# Patient Record
Sex: FEMALE | Race: WHITE
[De-identification: names, ages, dates, MRNs, and addresses within clinical notes are randomized per-mention and may not be internally consistent; named-entity substitution may affect disease eponyms.]

---

## 2020-02-21 ENCOUNTER — HOSPITAL ENCOUNTER (EMERGENCY)
Dept: HOSPITAL 41 - JD.ED | Age: 40
Discharge: HOME | End: 2020-02-21
Payer: COMMERCIAL
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
## 2020-02-21 DIAGNOSIS — F10.10: Primary | ICD-10-CM

## 2020-02-21 PROCEDURE — 80306 DRUG TEST PRSMV INSTRMNT: CPT

## 2020-02-21 PROCEDURE — 80053 COMPREHEN METABOLIC PANEL: CPT

## 2020-02-21 PROCEDURE — 96376 TX/PRO/DX INJ SAME DRUG ADON: CPT

## 2020-02-21 PROCEDURE — 96366 THER/PROPH/DIAG IV INF ADDON: CPT

## 2020-02-21 PROCEDURE — 96375 TX/PRO/DX INJ NEW DRUG ADDON: CPT

## 2020-02-21 PROCEDURE — 99284 EMERGENCY DEPT VISIT MOD MDM: CPT

## 2020-02-21 PROCEDURE — 36415 COLL VENOUS BLD VENIPUNCTURE: CPT

## 2020-02-21 PROCEDURE — 81025 URINE PREGNANCY TEST: CPT

## 2020-02-21 PROCEDURE — 96361 HYDRATE IV INFUSION ADD-ON: CPT

## 2020-02-21 PROCEDURE — 81003 URINALYSIS AUTO W/O SCOPE: CPT

## 2020-02-21 PROCEDURE — 83735 ASSAY OF MAGNESIUM: CPT

## 2020-02-21 PROCEDURE — 80307 DRUG TEST PRSMV CHEM ANLYZR: CPT

## 2020-02-21 PROCEDURE — 85025 COMPLETE CBC W/AUTO DIFF WBC: CPT

## 2020-02-21 PROCEDURE — 96365 THER/PROPH/DIAG IV INF INIT: CPT

## 2020-02-21 RX ADMIN — SODIUM CHLORIDE ONE MG: 9 INJECTION, SOLUTION INTRAVENOUS at 15:52

## 2020-02-21 RX ADMIN — SODIUM CHLORIDE ONE: 9 INJECTION, SOLUTION INTRAVENOUS at 15:54

## 2020-02-21 NOTE — EDM.PDOC
ED HPI GENERAL MEDICAL PROBLEM





- General


Chief Complaint: Drug or Alcohol Abuse


Stated Complaint: DETOX


Time Seen by Provider: 20 15:10





- History of Present Illness


INITIAL COMMENTS - FREE TEXT/NARRATIVE: 








40-year-old female presents to the emergency room for alcohol detox and 

possible placement into the [facility]





Patient has a long history of alcoholism.  She has tried to stop multiple 

times.  The patient was found to be acting intoxicated at her place of 

employment and was confronted with this today.  She is had issues with this in 

the past but the patient did not seek help.  Today they did contact [facility] 

who referred her here for detox and/or medical clearance.





Past medical history significant for heavy alcohol use.  She has no chronic 

medical problems denies being pregnant.  She has had a exploratory laparotomy 

with resultant oophorectomy because of pain.  Patient cannot provide any other 

information in this.  She is a  3 para 3





  ** Generalized


Pain Score (Numeric/FACES): 6





- Related Data


 Allergies











Allergy/AdvReac Type Severity Reaction Status Date / Time


 


No Known Allergies Allergy   Verified 20 15:16











Home Meds: 


 Home Meds





LORazepam [Lorazepam] 1 mg PO Q6H #20 tablet 20 [Rx]


Ondansetron [Zofran ODT] 4 mg PO Q6H #12 tab.dis 20 [Rx]











ED ROS GENERAL





- Review of Systems


Review Of Systems: See Below


Constitutional: Reports: No Symptoms, Weight Gain


Respiratory: Reports: No Symptoms


Cardiovascular: Reports: No Symptoms


Endocrine: Reports: No Symptoms


GI/Abdominal: Reports: Abdominal Pain (Upper abdominal discomfort), Nausea


Musculoskeletal: Reports: No Symptoms


Skin: Reports: No Symptoms


Neurological: Reports: Other


Psychiatric: Reports: No Symptoms


Hematologic/Lymphatic: Reports: Other


Immunologic: Reports: Other





ED EXAM, GENERAL





- Physical Exam


Exam: See Below


Exam Limited By: Intoxication (She is intoxicated but trying to be cooperative.)


General Appearance: Alert, No Apparent Distress


Ears: Normal External Exam, Normal Canal, Hearing Grossly Normal, Other (He has 

some scarring of both tympanic membranes apparently she had ear tubes as a child

)


Nose: Normal Inspection, Normal Mucosa, No Blood


Throat/Mouth: Normal Inspection, Normal Lips, Other (Mild evidence of dental 

decay)


Head: Atraumatic, Normocephalic


Neck: Normal Inspection, Supple, Non-Tender, Full Range of Motion.  No: 

Lymphadenopathy (L), Lymphadenopathy (R)


Respiratory/Chest: No Respiratory Distress, Lungs Clear, Normal Breath Sounds


Cardiovascular: Regular Rate, Rhythm, No Edema, No Murmur


GI/Abdominal: Normal Bowel Sounds, Soft, Non-Tender


Back Exam: Normal Inspection.  No: CVA Tenderness (L), CVA Tenderness (R)


Extremities: Normal Inspection, No Pedal Edema


Neurological: Other (Is intoxicated but cooperative)





Course





- Vital Signs


Last Recorded V/S: 


 Last Vital Signs











Temp  36.8 C   20 15:08


 


Pulse  80   20 18:30


 


Resp  20   20 15:08


 


BP  117/83   20 16:31


 


Pulse Ox  100   20 18:30














- Orders/Labs/Meds


Orders: 


 Active Orders 24 hr











 Category Date Time Status


 


 Bedrest Bathroom Privileges [[facility]] ASDIRECTED Care  20 19:05 Inactive


 


 CIWAA Assessment [[facility]] Q15M Care  20 19:05 Inactive


 


 CIW[facility] Assessment [RC] Q1H Care  20 19:05 Inactive


 


 CIW[facility] Assessment [RC] Q30M Care  20 19:05 Inactive


 


 CIW[facility] Assessment [RC] Q4H Care  20 19:05 Inactive


 


 CIW[facility] Assessment [RC] Q4H Care  20 19:49 Ordered


 


 Cardiac Monitoring [RC] CONTINUOUS Care  20 19:07 Inactive


 


 Intake and Output [RC] QSHIFT Care  20 19:07 Inactive


 


 Notify Provider Consults [RC] ASDIRECTED Care  20 19:08 Active


 


 Notify Provider [RC] PRN Care  20 19:05 Inactive


 


 Oxygen Therapy [RC] PRN Care  20 19:05 Inactive


 


 Pulse Oximetry [RC] CONTINUOUS Care  20 19:07 Inactive


 


 VTE/DVT Education [[facility]] PER UNIT ROUTINE Care  20 19:05 Inactive


 


 Vital Signs [RC] Q4H Care  20 19:05 Inactive


 


 Consult to Physician [CONS] Routine Cons  20 19:05 Active


 


 Potassium Chloride [KCl 10 MEQ in Water 100 ML] 10 meq Med  20 17:00 

Active





 Premix Bag 1 bag   





 IV ASDIRECTED   


 


 Potassium Chloride [KCl 10 MEQ in Water 100 ML] 10 meq Med  20 19:30 

Active





 Premix Bag 1 bag   





 IV Q1H   








 Medication Orders





Potassium Chloride 10 meq/ (Premix)  100 mls @ 100 mls/hr IV ASDIRECTED NADIA


   Last Admin: 20 17:13  Dose: 100 mls/hr


Potassium Chloride 10 meq/ (Premix)  100 mls @ 100 mls/hr IV Q1H NADIA


   Stop: 20 23:29








Labs: 


 Laboratory Tests











  20 Range/Units





  15:45 15:45 17:07 


 


WBC   6.99   (3.98-10.04)  K/mm3


 


RBC   4.79   (3.98-5.22)  M/mm3


 


Hgb   13.5   (11.2-15.7)  gm/dl


 


Hct   40.9   (34.1-44.9)  %


 


MCV   85.4   (79.4-94.8)  fl


 


MCH   28.2   (25.6-32.2)  pg


 


MCHC   33.0   (32.2-35.5)  g/dl


 


RDW Std Deviation   42.5   (36.4-46.3)  fL


 


Plt Count   183   (182-369)  K/mm3


 


MPV   10.5   (9.4-12.3)  fl


 


Neut % (Auto)   59.1   (34.0-71.1)  %


 


Lymph % (Auto)   35.1   (19.3-51.7)  %


 


Mono % (Auto)   4.0 L   (4.7-12.5)  %


 


Eos % (Auto)   1.1   (0.7-5.8)  


 


Baso % (Auto)   0.6   (0.1-1.2)  %


 


Neut # (Auto)   4.13   (1.56-6.13)  K/mm3


 


Lymph # (Auto)   2.45   (1.18-3.74)  K/mm3


 


Mono # (Auto)   0.28   (0.24-0.36)  K/mm3


 


Eos # (Auto)   0.08   (0.04-0.36)  K/mm3


 


Baso # (Auto)   0.04   (0.01-0.08)  K/mm3


 


Sodium  144    (136-145)  mEq/L


 


Potassium  3.4 L    (3.5-5.1)  mEq/L


 


Chloride  105    ()  mEq/L


 


Carbon Dioxide  22    (21-32)  mEq/L


 


Anion Gap  20.4 H    (5-15)  


 


BUN  10    (7-18)  mg/dL


 


Creatinine  0.8    (0.55-1.02)  mg/dL


 


Est Cr Clr Drug Dosing  85.76    mL/min


 


Estimated GFR (MDRD)  > 60    (>60)  mL/min


 


BUN/Creatinine Ratio  12.5 L    (14-18)  


 


Glucose  113 H    ()  mg/dL


 


Calcium  8.9    (8.5-10.1)  mg/dL


 


Magnesium  2.1    (1.8-2.4)  mg/dl


 


Total Bilirubin  0.6    (0.2-1.0)  mg/dL


 


AST  30    (15-37)  U/L


 


ALT  38    (14-59)  U/L


 


Alkaline Phosphatase  72    ()  U/L


 


Total Protein  8.1    (6.4-8.2)  g/dl


 


Albumin  4.5    (3.4-5.0)  g/dl


 


Globulin  3.6    gm/dL


 


Albumin/Globulin Ratio  1.3    (1-2)  


 


Urine Color     (Yellow)  


 


Urine Appearance     (Clear)  


 


Urine pH     (5.0-8.0)  


 


Ur Specific Gravity     (1.005-1.030)  


 


Urine Protein     (Negative)  


 


Urine Glucose (UA)     (Negative)  


 


Urine Ketones     (Negative)  


 


Urine Occult Blood     (Negative)  


 


Urine Nitrite     (Negative)  


 


Urine Bilirubin     (Negative)  


 


Urine Urobilinogen     (0.2-1.0)  


 


Ur Leukocyte Esterase     (Negative)  


 


Urine HCG, Qual    Negative  (NEGATIVE)  


 


Urine Opiates Screen     (ADCRHZ=233)  


 


Ur Buprenorphine Scrn     (CUTOFF=10)  


 


Ur Oxycodone Screen     (FPZ0AX=150)  


 


Urine Methadone Screen     (SNHBZH=701)  


 


Ur Propoxyphene Screen     (KIIFBO=906)  


 


Ur Barbiturates Screen     (BKIBHF=467)  


 


Ur Tricyclics Screen     (ZTSNEU=834)  


 


Ur Phencyclidine Scrn     (CUTOFF=25)  


 


Ur Amphetamine Screen     (XUIGWL=045)  


 


U Methamphetamines Scrn     (TMGFCQ=234)  


 


U Benzodiazepines Scrn     (BBAKYX=421)  


 


U Cocaine Metab Screen     (RWMQZS=383)  


 


U Marijuana (THC) Screen     (CUTOFF=50)  


 


Ethyl Alcohol  0.28    (0.00)  gm%














  02/21/20 02/21/20 02/21/20 Range/Units





  17:07 17:07 18:39 


 


WBC     (3.98-10.04)  K/mm3


 


RBC     (3.98-5.22)  M/mm3


 


Hgb     (11.2-15.7)  gm/dl


 


Hct     (34.1-44.9)  %


 


MCV     (79.4-94.8)  fl


 


MCH     (25.6-32.2)  pg


 


MCHC     (32.2-35.5)  g/dl


 


RDW Std Deviation     (36.4-46.3)  fL


 


Plt Count     (182-369)  K/mm3


 


MPV     (9.4-12.3)  fl


 


Neut % (Auto)     (34.0-71.1)  %


 


Lymph % (Auto)     (19.3-51.7)  %


 


Mono % (Auto)     (4.7-12.5)  %


 


Eos % (Auto)     (0.7-5.8)  


 


Baso % (Auto)     (0.1-1.2)  %


 


Neut # (Auto)     (1.56-6.13)  K/mm3


 


Lymph # (Auto)     (1.18-3.74)  K/mm3


 


Mono # (Auto)     (0.24-0.36)  K/mm3


 


Eos # (Auto)     (0.04-0.36)  K/mm3


 


Baso # (Auto)     (0.01-0.08)  K/mm3


 


Sodium     (136-145)  mEq/L


 


Potassium     (3.5-5.1)  mEq/L


 


Chloride     ()  mEq/L


 


Carbon Dioxide     (21-32)  mEq/L


 


Anion Gap     (5-15)  


 


BUN     (7-18)  mg/dL


 


Creatinine     (0.55-1.02)  mg/dL


 


Est Cr Clr Drug Dosing     mL/min


 


Estimated GFR (MDRD)     (>60)  mL/min


 


BUN/Creatinine Ratio     (14-18)  


 


Glucose     ()  mg/dL


 


Calcium     (8.5-10.1)  mg/dL


 


Magnesium     (1.8-2.4)  mg/dl


 


Total Bilirubin     (0.2-1.0)  mg/dL


 


AST     (15-37)  U/L


 


ALT     (14-59)  U/L


 


Alkaline Phosphatase     ()  U/L


 


Total Protein     (6.4-8.2)  g/dl


 


Albumin     (3.4-5.0)  g/dl


 


Globulin     gm/dL


 


Albumin/Globulin Ratio     (1-2)  


 


Urine Color  Yellow    (Yellow)  


 


Urine Appearance  Clear    (Clear)  


 


Urine pH  6.5    (5.0-8.0)  


 


Ur Specific Gravity  1.020    (1.005-1.030)  


 


Urine Protein  Negative    (Negative)  


 


Urine Glucose (UA)  Negative    (Negative)  


 


Urine Ketones  Negative    (Negative)  


 


Urine Occult Blood  Negative    (Negative)  


 


Urine Nitrite  Negative    (Negative)  


 


Urine Bilirubin  Negative    (Negative)  


 


Urine Urobilinogen  0.2    (0.2-1.0)  


 


Ur Leukocyte Esterase  Negative    (Negative)  


 


Urine HCG, Qual     (NEGATIVE)  


 


Urine Opiates Screen   Negative   (LVRZOV=469)  


 


Ur Buprenorphine Scrn   Negative   (CUTOFF=10)  


 


Ur Oxycodone Screen   Negative   (KTW5RM=240)  


 


Urine Methadone Screen   Negative   (VZSIQA=388)  


 


Ur Propoxyphene Screen   Negative   (PXUUAJ=957)  


 


Ur Barbiturates Screen   Negative   (APYGNM=028)  


 


Ur Tricyclics Screen   Negative   (SPUWWX=586)  


 


Ur Phencyclidine Scrn   Negative   (CUTOFF=25)  


 


Ur Amphetamine Screen   Negative   (DUSVIF=245)  


 


U Methamphetamines Scrn   Negative   (OVAXWT=258)  


 


U Benzodiazepines Scrn   Negative   (QDJWGZ=985)  


 


U Cocaine Metab Screen   Negative   (BRIZJH=028)  


 


U Marijuana (THC) Screen   Negative   (CUTOFF=50)  


 


Ethyl Alcohol    0.21  (0.00)  gm%











Meds: 


Medications











Generic Name Dose Route Start Last Admin





  Trade Name Freq  PRN Reason Stop Dose Admin


 


Potassium Chloride 10 meq/  100 mls @ 100 mls/hr  20 17:00  20 17:13





  Premix  IV   100 mls/hr





  ASDIRECTED NADIA   Administration





     





     





     





     


 


Potassium Chloride 10 meq/  100 mls @ 100 mls/hr  20 19:30  





  Premix  IV  20 23:29  





  Q1H NADIA   





     





     





     





     














Discontinued Medications














Generic Name Dose Route Start Last Admin





  Trade Name Freq  PRN Reason Stop Dose Admin


 


Acetaminophen  650 mg  20 19:23  20 19:32





  Tylenol  PO  20 19:24  650 mg





  NOW ONE   Administration





     





     





     





     


 


Lactated Ringer's  1,000 mls @ 999 mls/hr  20 15:17  20 15:51





  Ringers, Lactated  IV  20 16:17  999 mls/hr





  .BOLUS ONE   Administration





     





     





     





     


 


Lactated Ringer's  1,000 mls @ 999 mls/hr  20 15:20  20 17:00





  Ringers, Lactated  IV  20 16:20  999 mls/hr





  .BOLUS ONE   Administration





     





     





     





     


 


Lactated Ringer's  1,000 mls @ 125 mls/hr  20 19:15  





  Ringers, Lactated  IV   





  ASDIRECTED NADIA   





     





     





     





     


 


Lorazepam  0 mg  20 19:15  





  Ativan  IV   





  ASDIRECTED NADIA   





     





     





  Protocol   





     


 


Lorazepam  0 mg  20 19:15  





  Ativan  PO   





  ASDIRECTED NADIA   





     





     





  Protocol   





     


 


Lorazepam  1 mg  20 19:27  20 19:32





  Ativan  PO  20 19:28  1 mg





  ONETIME ONE   Administration





     





     





     





     


 


Ondansetron HCl  4 mg  20 15:31  20 15:54





  Zofran  IVPUSH  20 15:32  Not Given





  ONETIME ONE   





     





     





     





     


 


Ondansetron HCl  Confirm  20 15:35  20 15:55





  Zofran  Administered  20 15:36  Not Given





  Dose   





  4 mg   





  .ROUTE   





  .STK-MED ONE   





     





     





     





     


 


Ondansetron HCl  Confirm  20 15:36  20 15:55





  Zofran  Administered  20 15:37  Not Given





  Dose   





  4 mg   





  .ROUTE   





  .STK-MED ONE   





     





     





     





     


 


Ondansetron HCl  4 mg  20 19:05  20 19:21





  Zofran  IVPUSH   4 mg





  Q4H PRN   Administration





  Nausea   





     





     





     


 


Ondansetron HCl  Confirm  20 19:20  20 19:24





  Zofran  Administered  20 19:21  Not Given





  Dose   





  4 mg   





  .ROUTE   





  .STK-MED ONE   





     





     





     





     


 


Potassium Chloride  20 meq  20 16:45  20 19:23





  Klor-Con M20  PO  20 16:46  Not Given





  ONETIME ONE   





     





     





     





     














- Re-Assessments/Exams


Free Text/Narrative Re-Assessment/Exam: 





20 19:05


Initial blood alcohol 0.28.  She received 2 L of fluid at this point her blood 

alcohol is too high to go to the [facility] we will keep her here until her blood 

alcohol is less then 0.20 before sending to the [facility]


20 19:56


Second blood alcohol is 0.21 and another hour she will be under 0.20.  She will 

receive another 10 mEq potassium and then be discharged to the [facility].





Departure





- Departure


Time of Disposition: 19:30


Disposition: Home, Self-Care 01


Clinical Impression: 


 Alcohol abuse








- Discharge Information


Prescriptions: 


LORazepam [Lorazepam] 1 mg PO Q6H #20 tablet


Ondansetron [Zofran ODT] 4 mg PO Q6H #12 tab.dis


Referrals: 


PCP,None [Primary Care Provider] - 





Sepsis Event Note





- Evaluation


Sepsis Screening Result: No Definite Risk





- Focused Exam


Vital Signs: 


 Vital Signs











  Temp Pulse Pulse Resp BP BP Pulse Ox


 


 20 18:30   80      100


 


 20 18:15   78      99


 


 20 18:00   81      99


 


 20 17:45   71      100


 


 20 17:30   74      99


 


 20 17:15   86      100


 


 20 17:03   74      100


 


 20 16:45   79      98


 


 20 16:31   77    117/83   96


 


 20 16:30   75      96


 


 20 16:24   76    118/82   98


 


 20 16:23   82      99


 


 20 15:08  36.8 C   103 H  20   143/95 H  99











Date Exam was Performed: 20


Time Exam was Performed: 19:56





- My Orders


Last 24 Hours: 


My Active Orders





20 17:00


Potassium Chloride [KCl 10 MEQ in Water 100 ML] 10 meq   Premix Bag 1 bag IV 

ASDIRECTED 





20 19:30


Potassium Chloride [KCl 10 MEQ in Water 100 ML] 10 meq   Premix Bag 1 bag IV 

Q1H 





20 19:49


[facility] Assessment [[facility]] Q4H 














- Assessment/Plan


Last 24 Hours: 


My Active Orders





20 17:00


Potassium Chloride [KCl 10 MEQ in Water 100 ML] 10 meq   Premix Bag 1 bag IV 

ASDIRECTED 





20 19:30


Potassium Chloride [KCl 10 MEQ in Water 100 ML] 10 meq   Premix Bag 1 bag IV 

Q1H 





20 19:49


CI[facility] Assessment [[facility]] Q4H